# Patient Record
Sex: FEMALE | Race: WHITE | NOT HISPANIC OR LATINO | Employment: FULL TIME
[De-identification: names, ages, dates, MRNs, and addresses within clinical notes are randomized per-mention and may not be internally consistent; named-entity substitution may affect disease eponyms.]

---

## 2017-01-03 DIAGNOSIS — F32.0 MILD SINGLE CURRENT EPISODE OF MAJOR DEPRESSIVE DISORDER (H): Primary | ICD-10-CM

## 2017-01-03 RX ORDER — VENLAFAXINE HYDROCHLORIDE 150 MG/1
CAPSULE, EXTENDED RELEASE ORAL
Qty: 30 CAPSULE | Status: CANCELLED | OUTPATIENT
Start: 2017-01-03

## 2017-01-03 NOTE — TELEPHONE ENCOUNTER
Message from Pharm- Sig:  Take 1 Cap (150mg) daily W/evening meal along w/75mg cap for total of 225mg daily.    150mg was only requested at this time, but might want to consider sending in 75MG as well.        venlafaxine (EFFEXOR-XR) 150 MG 24 hr capsule    Last Written Prescription Date: 6/13/16  Per pharm last fill date: 11/9/16  Last Fill Quantity: 90, # refills: 1  Last Office Visit with G, P or [facility] prescribing provider: 10/14/2016          BP Readings from Last 3 Encounters:   11/07/16 118/80   10/14/16 118/80   08/05/16 120/70     Pulse: (for Fetzima)  CREATININE   Date Value Ref Range Status   03/11/2016 0.73 0.52 - 1.04 mg/dL Final   ]    Last PHQ-9 score on record= No flowsheet data found.      Routing refill request to provider for review/approval because:  Medication is reported/historical      YUE Irving

## 2017-01-03 NOTE — TELEPHONE ENCOUNTER
This medication has never been prescribed by a [facility] provider.  Pt will need to be seen or have prescribing provider write for refills.    Loretta Trotter CNP

## 2017-01-03 NOTE — TELEPHONE ENCOUNTER
Spoke with patient.  She transferred care from [facility]-Dr. Deedee Briggs and she had a bunch of refills when she left.  States that at one of her visits with [place] she told her that when she was running low to call and she would give her a refill.  She states that she is currently only taking the 150mg daily.  Please review.  Maribeth Navas RN

## 2017-01-05 RX ORDER — VENLAFAXINE HYDROCHLORIDE 150 MG/1
150 CAPSULE, EXTENDED RELEASE ORAL DAILY
Qty: 90 CAPSULE | Refills: 0 | Status: SHIPPED | OUTPATIENT
Start: 2017-01-05

## 2017-01-06 NOTE — TELEPHONE ENCOUNTER
If she is almost out I can get her some of the 150mg.  Just have her come in at some point to discuss with me so we have a history for her on this.  We could do a PHQ also on the phone.    THanks,  ailyn

## 2017-01-06 NOTE — TELEPHONE ENCOUNTER
LM to CB  Inform pt rx filled, do PHQ 9    Salma Osborn RN, BS  Message handled by Nurse Triage .

## 2020-03-10 ENCOUNTER — HEALTH MAINTENANCE LETTER (OUTPATIENT)
Age: 45
End: 2020-03-10

## 2020-12-27 ENCOUNTER — HEALTH MAINTENANCE LETTER (OUTPATIENT)
Age: 45
End: 2020-12-27

## 2021-03-06 ENCOUNTER — HEALTH MAINTENANCE LETTER (OUTPATIENT)
Age: 46
End: 2021-03-06

## 2021-04-24 ENCOUNTER — HEALTH MAINTENANCE LETTER (OUTPATIENT)
Age: 46
End: 2021-04-24

## 2021-08-14 ENCOUNTER — HEALTH MAINTENANCE LETTER (OUTPATIENT)
Age: 46
End: 2021-08-14

## 2021-10-04 ENCOUNTER — HEALTH MAINTENANCE LETTER (OUTPATIENT)
Age: 46
End: 2021-10-04

## 2022-05-15 ENCOUNTER — HEALTH MAINTENANCE LETTER (OUTPATIENT)
Age: 47
End: 2022-05-15

## 2022-09-11 ENCOUNTER — HEALTH MAINTENANCE LETTER (OUTPATIENT)
Age: 47
End: 2022-09-11

## 2023-06-03 ENCOUNTER — HEALTH MAINTENANCE LETTER (OUTPATIENT)
Age: 48
End: 2023-06-03

## 2023-10-07 ENCOUNTER — HEALTH MAINTENANCE LETTER (OUTPATIENT)
Age: 48
End: 2023-10-07

## 2024-09-18 ENCOUNTER — TELEPHONE (OUTPATIENT)
Dept: OTHER | Facility: CLINIC | Age: 49
End: 2024-09-18

## 2024-09-18 ENCOUNTER — LAB (OUTPATIENT)
Dept: LAB | Facility: CLINIC | Age: 49
End: 2024-09-18
Payer: COMMERCIAL

## 2024-09-18 DIAGNOSIS — I82.432 ACUTE DEEP VEIN THROMBOSIS OF LEFT POPLITEAL VEIN (H): ICD-10-CM

## 2024-09-18 DIAGNOSIS — I82.432 ACUTE DEEP VEIN THROMBOSIS OF LEFT POPLITEAL VEIN (H): Primary | ICD-10-CM

## 2024-09-18 LAB — D DIMER PPP FEU-MCNC: 1.17 UG/ML FEU (ref 0–0.5)

## 2024-09-18 PROCEDURE — 36415 COLL VENOUS BLD VENIPUNCTURE: CPT

## 2024-09-18 PROCEDURE — 85379 FIBRIN DEGRADATION QUANT: CPT

## 2024-09-18 NOTE — TELEPHONE ENCOUNTER
Routing to RN to clarify what is needed for scheduling, there is a pended D-dimer lab but no active request orders and writer could not find a staff message in Appointment scheduling pool related to this patient states Dr Rice wanted her to be scheduled ASAP.    Please route back to [facility] appointment scheduling pool.

## 2024-09-18 NOTE — TELEPHONE ENCOUNTER
Patient scheduled for the below. Added patient to waitlist for consult as she feels she needs to be seen sooner, Explained the below appointment request notes and reassured patient once scheduled that she is added to waitlist for both Dr Griffiths and Dr Lin and will be called when there is a cancellation.

## 2024-09-18 NOTE — TELEPHONE ENCOUNTER
Care Everywhere updated.  Dr. Rice also sent staff message regarding patient, screenshot noted below.        Routing to scheduling to coordinate the following:  D-dimer (ASAP, order pended)  Left leg venous US (within 1 week, ordered per Dr. Rice)  NEW VASCULAR PATIENT consult with Vascular Medicine  Please schedule this at next available    Appt note:  Ref by Dr. Michael Perkins ([facility] [place]) for left leg DVT; notes in Care Everywhere; D-dimer and LLE venous US to be completed prior.    Emily Ivan, LYSSAN, RN, CV-BC, CNOR  [facility] [place]

## 2024-09-18 NOTE — TELEPHONE ENCOUNTER
[facility]    Who is the name of the provider?:  NONE   What is the location you see this provider at/preferred location?: Nae  Person calling / Facility: Milagros Lund  Phone number:  [phone] (home)   Nurse call back needed:  yes     Reason for call:  Patient was at [facility] [place] last night. She was diagnosed with a LT DVT in her Leg. Doctor Gregorio told her to call to schedule with Doctor Griffiths today.Patient said she will get the records faxed to the clinic.    Pharmacy location:  [id number] [address]  [address]  Outside Imaging: n/a   Can we leave a detailed message on this number?  YES     9/18/2024, 8:08 AM

## 2024-09-19 ENCOUNTER — HOSPITAL ENCOUNTER (OUTPATIENT)
Dept: ULTRASOUND IMAGING | Facility: CLINIC | Age: 49
Discharge: HOME | End: 2024-09-19
Payer: COMMERCIAL

## 2024-09-19 DIAGNOSIS — I82.432 ACUTE DEEP VEIN THROMBOSIS OF LEFT POPLITEAL VEIN (H): ICD-10-CM

## 2024-09-19 PROCEDURE — 93971 EXTREMITY STUDY: CPT | Mod: LT

## 2024-09-21 ENCOUNTER — HEALTH MAINTENANCE LETTER (OUTPATIENT)
Age: 49
End: 2024-09-21

## 2024-10-04 ENCOUNTER — TELEPHONE (OUTPATIENT)
Dept: OTHER | Facility: CLINIC | Age: 49
End: 2024-10-04

## 2024-10-04 ENCOUNTER — HOSPITAL ENCOUNTER (OUTPATIENT)
Dept: ULTRASOUND IMAGING | Facility: CLINIC | Age: 49
Discharge: HOME | End: 2024-10-04
Attending: INTERNAL MEDICINE
Payer: COMMERCIAL

## 2024-10-04 ENCOUNTER — OFFICE VISIT (OUTPATIENT)
Dept: OTHER | Facility: CLINIC | Age: 49
End: 2024-10-04
Payer: COMMERCIAL

## 2024-10-04 VITALS
WEIGHT: 197 LBS | BODY MASS INDEX: 30.85 KG/M2 | DIASTOLIC BLOOD PRESSURE: 77 MMHG | HEART RATE: 64 BPM | OXYGEN SATURATION: 99 % | SYSTOLIC BLOOD PRESSURE: 122 MMHG

## 2024-10-04 DIAGNOSIS — I82.890 SUPERFICIAL VEIN THROMBOSIS: ICD-10-CM

## 2024-10-04 DIAGNOSIS — I82.432 ACUTE DEEP VEIN THROMBOSIS (DVT) OF POPLITEAL VEIN OF LEFT LOWER EXTREMITY (H): Primary | ICD-10-CM

## 2024-10-04 DIAGNOSIS — R10.2 PELVIC PAIN: ICD-10-CM

## 2024-10-04 DIAGNOSIS — D68.51 FACTOR 5 LEIDEN MUTATION, HETEROZYGOUS (H): ICD-10-CM

## 2024-10-04 DIAGNOSIS — M79.604 PAIN OF RIGHT LOWER EXTREMITY: ICD-10-CM

## 2024-10-04 PROCEDURE — 93971 EXTREMITY STUDY: CPT | Mod: RT

## 2024-10-04 PROCEDURE — 99205 OFFICE O/P NEW HI 60 MIN: CPT | Performed by: INTERNAL MEDICINE

## 2024-10-04 PROCEDURE — G2211 COMPLEX E/M VISIT ADD ON: HCPCS | Performed by: INTERNAL MEDICINE

## 2024-10-04 PROCEDURE — 99213 OFFICE O/P EST LOW 20 MIN: CPT | Performed by: INTERNAL MEDICINE

## 2024-10-04 RX ORDER — SERTRALINE HYDROCHLORIDE 100 MG/1
200 TABLET, FILM COATED ORAL
COMMUNITY
Start: 2024-08-23

## 2024-10-04 NOTE — TELEPHONE ENCOUNTER
Patient is scheduled for her CTV on 10/11/24 and in person follow up (patient request) with Dr Lin on 10/16/24.

## 2024-10-04 NOTE — TELEPHONE ENCOUNTER
"Returned call to patient and she has several questions and concerns about todays visit and states   \" I was told I do not have a clot and after I left I saw the US report stating I have a SVT of small SSV RT calf area 10/4/2024 and I am also concerned that the Xarelto I am on is failing.\" I explained I will send a message to Dr. Lin requesting he call her to further discuss today's visit.    Sanjuanita JASON, RN    [facility]  [facility] Center  Office: [phone]  Fax: [phone]         "

## 2024-10-04 NOTE — PROGRESS NOTES
[facility] INITIAL VASCULAR MEDICINE CONSULT    ( New patient visit)       PRIMARY HEALTH CARE PROVIDER:  Clinic, [facility], Crys Frederick NP       REFERRING HEALTH CARE PROVIDER;  Michael Perkins MD      REASON FOR CONSULT: Evaluation and management of first lifetime thromboembolic event developed left lower extremity superficial vein thrombosis and deep venous thrombosis initiated Xarelto now experiencing right leg pain      HPI: Milagros Lund is a 49 year old very pleasant female non-smoker, nondiabetic and no personal history of malignancy and no family history of hypercoagulable status 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  Now she has been having right leg pain and discomfort 3 weeks ago right lower extremity venous duplex also negative for DVT.  She has a factor V Leyden mutation her daughters also positive but none of them had a clot.  No recent history of travel, surgeries, hospitalization.  No recent COVID infection or COVID-vaccine.  She is up-to-date with age and gender appropriate cancer screening.  She underwent hysterectomy few years ago for dysfunctional uterine bleeding without oophorectomy.    She is new to me reviewed available records in the epic and updated chart      PAST MEDICAL HISTORY  Past Medical History:   Diagnosis Date    Depression     DVT (deep venous thrombosis) (H) LLE     Superficial vein thrombosis        CURRENT MEDICATIONS  Current Outpatient Medications   Medication Sig Dispense Refill    Multiple  Vitamins-Minerals (MULTIPLE VITAMIN  S/WOMENS) TABS       rivaroxaban ANTICOAGULANT (XARELTO) 15 MG TABS tablet       sertraline (ZOLOFT) 100 MG tablet Take 200 mg by mouth.       No current facility-administered medications for this visit.       PAST SURGICAL HISTORY:  Past Surgical History:   Procedure Laterality Date    HYSTERECTOMY         ALLERGIES   No Known Allergies    FAMILY HISTORY  Family History   Problem Relation Age of Onset    Hyperlipidemia Father     Coronary Artery Disease Father            SOCIAL HISTORY  Social History     Socioeconomic History    Marital status: Single     Spouse name: Not on file    Number of children: Not on file    Years of education: Not on file    Highest education level: Not on file   Occupational History    Not on file   Tobacco Use    Smoking status: Never    Smokeless tobacco: Never   Substance and Sexual Activity    Alcohol use: Yes     Alcohol/week: 0.0 standard drinks of alcohol     Comment: occ.    Drug use: No    Sexual activity: Yes     Partners: Male     Birth control/protection: Pill   Other Topics Concern    Parent/sibling w/ CABG, MI or angioplasty before 65F 55M? Yes   Social History Narrative    Not on file     Social Determinants of Health     Financial Resource Strain: Low Risk  (9/16/2024)    Received from [facility]    Financial Resource Strain     Difficulty of Paying Living Expenses: 3     Difficulty of Paying Living Expenses: Not on file   Food Insecurity: No Food Insecurity (9/16/2024)    Received from [facility] [facility]    Food Insecurity     Worried About Running Out of Food in the Last Year: 1   Transportation Needs: No Transportation Needs (9/16/2024)    Received from [facility] [facility]    Transportation Needs     Lack of Transportation (Medical): 1   Physical Activity: Not on file   Stress: Not on file   Social Connections: Socially Integrated (9/16/2024)     [facility]    Social Connections     Frequency of Communication with Friends and Family: 0   Interpersonal Safety: Not on file   Housing Stability: Low Risk  (9/16/2024)    Received from [facility]    Housing Stability     Unable to Pay for Housing in the Last Year: 1       ROS:   General: No change in weight, sleep or appetite.  Normal energy.  No fever or chills  Eyes: Negative for vision changes or eye problems  ENT: No problems with ears, nose or throat.  No difficulty swallowing.  Resp: No coughing, wheezing or shortness of breath  CV: No chest pains or palpitations  GI: No nausea, vomiting,  heartburn, abdominal pain, diarrhea, constipation or change in bowel habits  : No urinary frequency or dysuria, bladder or kidney problems  Musculoskeletal: No significant muscle or joint pains  Neurologic: No headaches, numbness, tingling, weakness, problems with balance or coordination  Psychiatric: No problems with anxiety, depression or mental health  Heme/immune/allergy: No history of bleeding or clotting problems or anemia.  No allergies or immune system problems  Endocrine: No history of thyroid disease, diabetes or other endocrine disorders  Skin: No rashes,worrisome lesions or skin problems  Vascular: Developed left lower extremity GSV thrombosis with thrombophlebitis, popliteal vein thrombosis and gastrinomas vein thrombosis on the left side  Ongoing right leg pain    EXAM:  /77 (BP Location: Right arm, Patient Position: Chair, Cuff Size: Adult Regular)   Pulse 64   Wt 197 lb (89.4 kg)   SpO2 99%   BMI 30.85 kg/m    In general, the patient is a pleasant female in no apparent distress.    HEENT: NC/AT.  PERRLA.  EOMI.  Sclerae white, not injected.  Nares clear.  Pharynx without erythema or exudate.  Dentition intact.    Neck: No adenopathy.  No thyromegaly. Carotids +2/2 bilaterally without bruits.  No jugular venous  distension.   Heart: RRR. Normal S1, S2 splits physiologically. No murmur, rub, click, or gallop. The PMI is in the 5th ICS in the midclavicular line. There is no heave.    Lungs: CTA.  No ronchi, wheezes, rales.  No dullness to percussion.   Abdomen: Soft, nontender, nondistended. No organomegaly. No AAA.  No bruits.   Extremities: Symmetrical appearance of bilateral lower extremities  Prominent GSV with palpable cord on the left upper part of the leg and lower part of the thigh area and slightly tender  No edema  Bilateral symmetrical palpable peripheral pulses in both lower and upper extremities        Labs:  LIPID RESULTS:  Lab Results   Component Value Date    CHOL 141 08/19/2013    HDL 55 08/19/2013    LDL 72 08/19/2013    TRIG 70 08/19/2013       LIVER ENZYME RESULTS:  Lab Results   Component Value Date    AST 8 03/11/2016    ALT 18 03/11/2016       BMP RESULTS:  Lab Results   Component Value Date     03/11/2016    POTASSIUM 3.7 03/11/2016    CHLORIDE 106 03/11/2016    CO2 26 03/11/2016    ANIONGAP 8 03/11/2016    GLC 93 03/11/2016    BUN 12 03/11/2016    CR 0.73 03/11/2016    GFRESTIMATED 88 03/11/2016    GFRESTBLACK >90   GFR Calc   03/11/2016    JACOB 8.2 (L) 03/11/2016          THYROID RESULTS:  Lab Results   Component Value Date    TSH 0.66 09/28/2015       Procedures:     Reviewed recent multiple imaging studies in [facility]   VENOUS ULTRASOUND RIGHT LOWER EXTREMITY October 4, 2024 9:45 AM      HISTORY: Pain of right lower extremity.     COMPARISON: None.     TECHNIQUE: Color Doppler and spectral waveform analysis performed  throughout the deep veins of the right lower extremity.     FINDINGS: The visualized external iliac, common femoral, proximal  great saphenous, femoral, and popliteal veins demonstrate normal blood  flow, compression, and augmentation. Posterior tibial and peroneal  veins are compressible. Nonocclusive thrombus in the small saphenous  vein. Contralateral left common  femoral vein is patent.                                                                      IMPRESSION:  1. Negative for deep vein thrombosis in the right lower extremity.  2. Superficial nonocclusive thrombophlebitis in the right small  saphenous vein.     JAYSON DEL CID MD         Assessment and Plan:     1. Acute deep vein thrombosis (DVT) of popliteal vein and GCN vein of left lower extremity (H) 9/18/2024- First life time unprovoked  - Compression Sleeve/Stocking Order for DME - ONLY FOR DME    2. Superficial vein thrombosis left in calf area GSV 9/11/2024 and SVT of small SSV RT calf area 10/4/2024    3. Factor 5 Leiden mutation, heterozygous (H)    4. Pain of right lower extremity  And Lower abdominal and pelvic pain   - US Lower Extremity Venous Duplex Left; Future       This is a very pleasant 49-year-old female developed first lifetime thromboembolic event appears unprovoked in the form of left lower extremity DVT involving popliteal vein and gastrinomas veins and also she developed superficial vein thrombosis and thrombophlebitis initially left GSV now right SSV and she is taking Xarelto appropriately 15 mg twice a day for 21 days then followed by 20 mg daily.  She is not using any compression stockings.  She is non-smoker.  No personal history of malignancy.  She is up-to-date with age and gender appropriate cancer screening.  She underwent hysterectomy many years ago without oophorectomy for dysfunctional uterine bleeding.  She is dealing with pelvic pain and most recent CT scan was unremarkable.  She also has a heterozygous factor V Leyden mutation and her both daughters also positive and they do not have any history of DVT.  Given her ongoing symptoms of pelvic pain and left lower extremity DVT and bilateral SVT  Most importantly right leg pain now I have obtained stat right lower extremity venous duplex there is no DVT but small saphenous vein SVT with thrombophlebitis noted    I had a lengthy  discussion with the patient, heterozygous factor V Leyden mutation may put higher risk for recurrent DVT.  Given unprovoked first lifetime DVT she is a long-term candidate for anticoagulation after completion of the full anticoagulation for 3 or more months    I will arrange CT venogram of abdomen pelvis to evaluate further for her pelvic pain etc.    Will hold off any hypercoagulable studies and overall management will not change besides Xarelto  can give false positive APLA testing    DME prescription for compression stockings given    Virtual visit few days after CT venogram      60 minutes spent on the date of the encounter doing chart review, history and exam, documentation, and further activities as noted above.    The longitudinal care of plan for the above diagnoses was addressed during this visit. Due to added complexity of care, we will continue to supprt Milagros Lund and the subsequent management of this/these conditions and with ongoing continuity of care for this/these conditions.     Thank for the consultation  This note was dictated by utilizing Dragon software  Copy of this note to primary care provider    AVS with written instructions given      Magalis Lin MD,FASILVIA,FSVM,FNLA, FACP  Vascular Medicine  Clinical Hypertension Specialist   Clinical Lipidologist

## 2024-10-04 NOTE — PROGRESS NOTES
[facility]        Patient is here for a consult to discuss DVT/PE    Pt is currently taking Xarelto.    /77 (BP Location: Right arm, Patient Position: Chair, Cuff Size: Adult Regular)   Pulse 64   Wt 197 lb (89.4 kg)   SpO2 99%   BMI 30.85 kg/m      The provider has been notified that the patient has no concerns.     Questions patient would like addressed today are: N/A.    Refills are needed: N/A    Has homecare services and agency name:  Jimena Eaton MA

## 2024-10-04 NOTE — PATIENT INSTRUCTIONS
Leg ultrasound today no DVT or SVT on Rt leg     Use compression stockings and elevate legs when able DME Rx given     Continue xarelto 15 mg Twice a day for 21 days then 20 mg daily with food     We will arrange CT venogram of abdomen, pelvis with legs to evaluate further for ongoing pelvic symptoms etc     Virtual visit after CT venogram

## 2024-10-04 NOTE — TELEPHONE ENCOUNTER
[facility]ULAR [facility]    Who is the name of the provider? Dr Lin    What is the location you see this provider at/preferred location? Nae    Person calling / Facility: Milagros     Phone number: [phone]    Nurse call back needed:     Reason for call: Pt was seen today (10/04/24) and had an Ultrasound done while she was here. Pt stated that she was told she did not have a blood clot. After patient left the clinic her results came up on MyChart and patient stated she has  Superficial nonocclusive thrombophlebitis in the right small saphenous vein. Pt is confused as whether she does or does Not have a blood clot?      Pharmacy location: N/A    Outside Imaging: N/A    Can we leave a detailed message on this number? Y    Additional Info:

## 2024-10-04 NOTE — TELEPHONE ENCOUNTER
Routing to scheduling to coordinate the following:    CTV abdomen pelvis with runoff   Virtual follow up 1 week after CTV  Please schedule this at next available     Appt note: Follow up to 10/4/24    Sanjuanita JASON, RN    [facility]  Office: [phone]  Fax: [phone]

## 2024-10-11 ENCOUNTER — HOSPITAL ENCOUNTER (OUTPATIENT)
Dept: CT IMAGING | Facility: CLINIC | Age: 49
Discharge: HOME | End: 2024-10-11
Attending: INTERNAL MEDICINE | Admitting: INTERNAL MEDICINE
Payer: COMMERCIAL

## 2024-10-11 DIAGNOSIS — I82.890 SUPERFICIAL VEIN THROMBOSIS: ICD-10-CM

## 2024-10-11 DIAGNOSIS — R10.2 PELVIC PAIN: ICD-10-CM

## 2024-10-11 DIAGNOSIS — I82.432 ACUTE DEEP VEIN THROMBOSIS (DVT) OF POPLITEAL VEIN OF LEFT LOWER EXTREMITY (H): ICD-10-CM

## 2024-10-11 DIAGNOSIS — D68.51 FACTOR 5 LEIDEN MUTATION, HETEROZYGOUS (H): ICD-10-CM

## 2024-10-11 PROCEDURE — 250N000011 HC RX IP 250 OP 636: Performed by: INTERNAL MEDICINE

## 2024-10-11 PROCEDURE — 250N000009 HC RX 250: Performed by: INTERNAL MEDICINE

## 2024-10-11 PROCEDURE — 74174 CTA ABD&PLVS W/CONTRAST: CPT

## 2024-10-11 RX ORDER — IOPAMIDOL 755 MG/ML
96 INJECTION, SOLUTION INTRAVASCULAR ONCE
Status: COMPLETED | OUTPATIENT
Start: 2024-10-11 | End: 2024-10-11

## 2024-10-11 RX ADMIN — SODIUM CHLORIDE 67 ML: 9 INJECTION, SOLUTION INTRAVENOUS at 07:17

## 2024-10-11 RX ADMIN — IOPAMIDOL 96 ML: 755 INJECTION, SOLUTION INTRAVENOUS at 07:17

## 2024-10-16 ENCOUNTER — VIRTUAL VISIT (OUTPATIENT)
Dept: OTHER | Facility: CLINIC | Age: 49
End: 2024-10-16
Attending: INTERNAL MEDICINE
Payer: COMMERCIAL

## 2024-10-16 DIAGNOSIS — I82.890 SUPERFICIAL VEIN THROMBOSIS: ICD-10-CM

## 2024-10-16 DIAGNOSIS — D68.51 FACTOR 5 LEIDEN MUTATION, HETEROZYGOUS (H): ICD-10-CM

## 2024-10-16 DIAGNOSIS — I82.432 ACUTE DEEP VEIN THROMBOSIS (DVT) OF POPLITEAL VEIN OF LEFT LOWER EXTREMITY (H): Primary | ICD-10-CM

## 2024-10-16 PROCEDURE — 99214 OFFICE O/P EST MOD 30 MIN: CPT | Mod: 95 | Performed by: INTERNAL MEDICINE

## 2024-10-16 PROCEDURE — G2211 COMPLEX E/M VISIT ADD ON: HCPCS | Mod: 95 | Performed by: INTERNAL MEDICINE

## 2024-10-16 RX ORDER — ENOXAPARIN SODIUM 100 MG/ML
INJECTION SUBCUTANEOUS
COMMUNITY
Start: 2024-10-11

## 2024-10-16 NOTE — PROGRESS NOTES
Milagros is a 49 year old who is being evaluated via a billable video visit.    How would you like to obtain your AVS? MyChart  If the video visit is dropped, the invitation should be resent by: Text to cell phone: [phone]  Will anyone else be joining your video visit? No        Domi Eaton MA

## 2024-10-16 NOTE — PROGRESS NOTES
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  Now she has been having right leg pain and discomfort 3 weeks ago right lower extremity venous duplex also negative for DVT.  She has a factor V Leyden mutation her daughters also positive but none of them had a clot.  No recent history of travel, surgeries, hospitalization.  No recent COVID infection or COVID-vaccine.  She is up-to-date with age and gender appropriate cancer screening.  She underwent hysterectomy few years ago for dysfunctional uterine bleeding without oophorectomy.      Review of systems: Reviewed all 12 point review of systems as per HPI otherwise unremarkable    Physical exam:( no physical exam done this is virtual visit)    Reviewed recent laboratory tests, imaging studies in the epic and updated chart    CTV ABDOMEN/PELVIS WITH CONTRAST October 11, 2024 7:28 AM      HISTORY:  Lower extremity deep vein thrombosis. Hypercoagulable state.  Pelvic pain. Concerns for proximal DVT.     COMPARISON: Lower extremity venous ultrasound dated 9/19/2024 and  10/4/2024.     TECHNIQUE: CT venogram of the abdomen and pelvis was performed  following the administration of 96 cc intravenous contrast. Images are  viewed in multiple planes and 3-D reconstructions were also performed.  Radiation dose for this scan was reduced using automated exposure  control, adjustment of the mA and/or kV according to patient size, or  iterative reconstruction technique.     FINDINGS:   Vascular exam: The IVC iliac veins and proximal femoral veins are  patent. No evidence for extrinsic compression on these veins. No  significant pelvic varicosities. The left ovarian vein is mildly  prominent proximally but does not give rise to significant  varicosities in the pelvis.     The visualized abdominal aorta, visceral branches of the abdominal  aorta and iliac arteries are patent.     Soft tissue exam: The lung bases are clear.     The spleen is at the upper limits of normal for length. Visualized  solid organs are grossly unremarkable. The bowel is grossly  unremarkable.                                                                      IMPRESSION: The veins of the abdomen and pelvis appear grossly  unremarkable.     RG FLEMING MD       Video Visit Details    Type of Service: Video Visit    Video Start Time: 7:59 AM    Video End Time: 8:20  AM    Total 30 minutes spent on the date of the encounter doing chart review, review of recent imaging studies outside evaluation, history, documentation and addressed above-mentioned issues  AVS with written instructions done    The longitudinal care of plan for the above diagnoses was addressed during this visit. Due to added complexity of care, we will continue to supprt Milagros Lund and the subsequent management of this/these conditions and with ongoing continuity of care  for this/these conditions.       Originating Location (patient location): Home    Distant Location (provider location): [facility]    Mode of Communication:  Video Conference via [employer]    Assessment and plan:    1. Hx deep vein thrombosis (DVT) of popliteal vein and GCN vein of left lower extremity (H) 9/18/2024- First life time unprovoked    2. Superficial vein thrombosis left in calf area GSV 9/11/2024 and SVT of small SSV RT calf area 10/4/2024     3. Factor 5 Leiden mutation, heterozygous (H)     4. Pain of right lower extremity  And Lower abdominal and pelvic pain   ( Negative CT venogram)          This is a very pleasant 49-year-old female developed first lifetime thromboembolic event appears unprovoked in the form of left lower extremity DVT involving popliteal vein and gastrinomas veins and also she developed superficial vein thrombosis and thrombophlebitis initially left GSV now right SSV and she is taking Xarelto appropriately 15 mg twice a day for 21 days then followed by 20 mg daily.  She is not using any compression stockings.  She is non-smoker.  No personal history of malignancy.  She is up-to-date with age and gender appropriate cancer screening.  She underwent hysterectomy many years ago without oophorectomy for dysfunctional uterine bleeding.  She is dealing with pelvic pain and most recent CT scan was unremarkable.  She also has a heterozygous factor V Leyden mutation and her both daughters also positive and they do not have any history of DVT.  Given her ongoing symptoms of pelvic pain and left lower extremity DVT and bilateral SVT  Most importantly right leg pain now I have obtained stat right lower extremity venous duplex there is no DVT but small saphenous vein SVT with thrombophlebitis noted     I had a lengthy discussion with the patient, heterozygous factor V Leyden mutation may put higher risk for recurrent DVT.  Given unprovoked first lifetime DVT she is a long-term candidate for  anticoagulation after completion of the full anticoagulation for 3 or more months     Reviewed recent CT results with the patient no May-Thurner syndrome no proximal DVT, IVC is normal  Currently wearing compression stockings and recently switched Xarelto to Lovenox by hematologist and is planning to continue until December then do the hypercoagulable tests     Will hold off any hypercoagulable studies and overall management will not change besides recent use of Xarelto and current use of Lovenox can give false positive APLA testing     Follow-up with hematology oncologist as scheduled    See me in January 2025         This visit is being conducted as a virtual visit due to the emphasis on mitigation of the COVID-19 virus pandemic. The clinician has decided that the risk of an in-office visit outweighs the benefit for this patient.      Magalis Lin MD,FASILVIA,FSVM,FNLA, FACP  Vascular Medicine  Clinical Hypertension Specialist   Clinical Lipidologist

## 2024-11-11 DIAGNOSIS — I82.432 ACUTE DEEP VEIN THROMBOSIS (DVT) OF POPLITEAL VEIN OF LEFT LOWER EXTREMITY (H): Primary | ICD-10-CM

## 2024-11-11 DIAGNOSIS — I82.890 SUPERFICIAL VEIN THROMBOSIS: ICD-10-CM

## 2024-11-11 DIAGNOSIS — D68.51 FACTOR 5 LEIDEN MUTATION, HETEROZYGOUS (H): ICD-10-CM

## 2025-01-24 ENCOUNTER — TRANSFERRED RECORDS (OUTPATIENT)
Dept: HEALTH INFORMATION MANAGEMENT | Facility: CLINIC | Age: 50
End: 2025-01-24

## 2025-01-28 ENCOUNTER — HOSPITAL ENCOUNTER (OUTPATIENT)
Dept: ULTRASOUND IMAGING | Facility: CLINIC | Age: 50
Discharge: HOME | End: 2025-01-28
Attending: INTERNAL MEDICINE
Payer: COMMERCIAL

## 2025-01-28 DIAGNOSIS — D68.51 FACTOR V LEIDEN MUTATION: ICD-10-CM

## 2025-01-28 DIAGNOSIS — I80.202 DEEP VEIN THROMBOPHLEBITIS OF LEFT LEG (H): ICD-10-CM

## 2025-01-28 PROCEDURE — 93970 EXTREMITY STUDY: CPT

## 2025-04-28 ENCOUNTER — TRANSFERRED RECORDS (OUTPATIENT)
Dept: HEALTH INFORMATION MANAGEMENT | Facility: CLINIC | Age: 50
End: 2025-04-28

## 2025-06-03 ENCOUNTER — TRANSCRIBE ORDERS (OUTPATIENT)
Dept: OTHER | Age: 50
End: 2025-06-03

## 2025-06-03 ENCOUNTER — MEDICAL CORRESPONDENCE (OUTPATIENT)
Dept: HEALTH INFORMATION MANAGEMENT | Facility: CLINIC | Age: 50
End: 2025-06-03
Payer: COMMERCIAL

## 2025-06-03 DIAGNOSIS — I82.409 DVT (DEEP VENOUS THROMBOSIS) (H): Primary | ICD-10-CM
